# Patient Record
Sex: FEMALE | Race: WHITE | NOT HISPANIC OR LATINO | URBAN - METROPOLITAN AREA
[De-identification: names, ages, dates, MRNs, and addresses within clinical notes are randomized per-mention and may not be internally consistent; named-entity substitution may affect disease eponyms.]

---

## 2018-12-15 ENCOUNTER — EMERGENCY (EMERGENCY)
Facility: HOSPITAL | Age: 43
LOS: 1 days | Discharge: HOME | End: 2018-12-15
Attending: EMERGENCY MEDICINE
Payer: COMMERCIAL

## 2018-12-15 VITALS
DIASTOLIC BLOOD PRESSURE: 81 MMHG | HEIGHT: 64 IN | WEIGHT: 130.07 LBS | TEMPERATURE: 99 F | SYSTOLIC BLOOD PRESSURE: 122 MMHG | HEART RATE: 90 BPM | OXYGEN SATURATION: 98 % | RESPIRATION RATE: 18 BRPM

## 2018-12-15 VITALS
SYSTOLIC BLOOD PRESSURE: 115 MMHG | HEART RATE: 88 BPM | TEMPERATURE: 98 F | RESPIRATION RATE: 18 BRPM | DIASTOLIC BLOOD PRESSURE: 55 MMHG | OXYGEN SATURATION: 98 %

## 2018-12-15 PROCEDURE — 99283 EMERGENCY DEPT VISIT LOW MDM: CPT

## 2018-12-15 RX ORDER — FAMOTIDINE 10 MG/ML
40 INJECTION INTRAVENOUS ONCE
Qty: 0 | Refills: 0 | Status: COMPLETED | OUTPATIENT
Start: 2018-12-15 | End: 2018-12-15

## 2018-12-15 RX ORDER — FAMOTIDINE 10 MG/ML
1 INJECTION INTRAVENOUS
Qty: 14 | Refills: 0 | OUTPATIENT
Start: 2018-12-15 | End: 2018-12-21

## 2018-12-15 RX ORDER — DIPHENHYDRAMINE HCL 50 MG
50 CAPSULE ORAL ONCE
Qty: 0 | Refills: 0 | Status: COMPLETED | OUTPATIENT
Start: 2018-12-15 | End: 2018-12-15

## 2018-12-15 RX ADMIN — Medication 50 MILLIGRAM(S): at 22:29

## 2018-12-15 RX ADMIN — FAMOTIDINE 40 MILLIGRAM(S): 10 INJECTION INTRAVENOUS at 22:29

## 2018-12-15 RX ADMIN — Medication 50 MILLIGRAM(S): at 22:30

## 2018-12-15 NOTE — ED PROVIDER NOTE - NS ED ROS FT
Constitutional: no fevers, chills  HEENT: no visual changes, no sore throat, no rhinorrhea  CV: no cp  Resp: no sob  GI: no abd pain, n/v, diarrhea/constipation  : no dysuria, hematuria  MSK: no joint pains  skin: +generalized uticaria  neuro: no HA, no confusion  psych: no SI/HI

## 2018-12-15 NOTE — ED PROVIDER NOTE - OBJECTIVE STATEMENT
44yo F h/o anxiety p/w generalized uticaria which started last night. reports she has been taking bactrim x9d, stopped yesterday, for a sinus infection. Rash started on her L arm and has since spread. Saw her PMD today who rx her medrol dose pack and hydroxyzine, however reports the rash is continuing to worsen. Only new exposure is bactrim and ate peking duck yesterday which she has never had. Denies any other new foods, detergents, clothing. + itching. Denies diff breathing, voice changes, diff swallowing, sob, abd pain, n/v. Took 20mg prednisone and hydroxyzine this AM.

## 2018-12-15 NOTE — ED PROVIDER NOTE - ATTENDING CONTRIBUTION TO CARE
Scarlet Macias MD - Attending Physician: I have personally seen and examined this patient with the resident/fellow.  I have fully participated in the care of this patient. I have reviewed all pertinent clinical information, including history, physical exam, plan and the Resident/Fellow’s note and agree except as noted. See MDM

## 2018-12-15 NOTE — ED PROVIDER NOTE - PHYSICAL EXAMINATION
Vitals: WNL  Gen: anxious, able to speak full sentences  Head: NCAT  ENT: sclerae white, anicterus, moist mucous membranes, no tonsillar/uvula swelling  CV: RRR. Audible S1 and S2. No murmurs, rubs, gallops, S3, nor S4,   Pulm: Clear to auscultation bilaterally. No wheezes, rales, or rhonchi  Abd: soft, normoactive BS x4, NTND, no rebound, no guarding, no rashes  Musculoskeletal:  No peripheral edema  Skin: generalized blanching uticaria all over body including face, torso, UE/LE

## 2018-12-15 NOTE — ED ADULT NURSE NOTE - NSIMPLEMENTINTERV_GEN_ALL_ED
Patient is rescheduled for 01/31   Implemented All Universal Safety Interventions:  [place] to call system. Call bell, personal items and telephone within reach. Instruct patient to call for assistance. Room bathroom lighting operational. Non-slip footwear when patient is off stretcher. Physically safe environment: no spills, clutter or unnecessary equipment. Stretcher in lowest position, wheels locked, appropriate side rails in place.

## 2018-12-15 NOTE — ED PROVIDER NOTE - NSFOLLOWUPCLINICS_GEN_ALL_ED_FT
[facility]  Allergy  [address] [place] [address]  Phone: [phone]  Fax:   Follow Up Time:

## 2018-12-15 NOTE — ED ADULT NURSE NOTE - OBJECTIVE STATEMENT
42 y/o female, c/o rash and itching beginning on left forearm and progressing to all extremities, torso, and face since friday. Saw  as outpatient, given atarax and medrol dosepack, came to ED due to no improvement and getting worse. Reports taking bactrim past week for sinus infection. Only new food was eating Duck earlier on Friday. Began new skin care products 1 week ago. Denies headache, weakness, dizziness, fevers, chills, or chest pains. No angioedema, no difficulty swallowing. Breathing even, full, unlabored, no cough, no SOB. Abdomen soft, nontender, nondistended, no nausea/vomiting/constipation/diarrhea/urinary complaints. Skin warm/dry, hives of all extremities, torso, and face. Diffuse pruritis of all extremities, torso, and face. Describes burning sensation of axillae. Stretcher locked in low position. Advised of plan of care. Family at bedside.

## 2018-12-15 NOTE — ED PROVIDER NOTE - NSFOLLOWUPINSTRUCTIONS_ED_ALL_ED_FT
Please take Benadryl 25mg every 6 hours as needed for itching.     Return to the ED if you have difficulty breathing, facial swelling, vomiting, or any other concerns

## 2018-12-15 NOTE — ED PROVIDER NOTE - MEDICAL DECISION MAKING DETAILS
44yo F h/o anxiety p/w generalized uticaria which started last night. no concern for anaphylaxis. likely allergic reaction. benadryl, steroids, pepcid. will send rx to pharmacy and d/c with strict return precautions.. 42yo F h/o anxiety p/w generalized uticaria which started last night. no concern for anaphylaxis. likely allergic reaction. benadryl, steroids, pepcid. will send rx to pharmacy and d/c with strict return precautions.    Scarlet Macias MD - Attending Physician: Pt here with urticaria, no systemic allergy systems. Well appearing, no airway concerns. Supportive care. Meds as ordered. F/u with allergy

## 2018-12-15 NOTE — ED ADULT NURSE REASSESSMENT NOTE - NS ED NURSE REASSESS COMMENT FT1
Pt a&o x3, resting on stretcher, in no acute distress, vital signs stable, reports feeling well enough to return home. D/C as per Dr Mathis.

## 2018-12-15 NOTE — ED ADULT NURSE NOTE - CHIEF COMPLAINT QUOTE
allergic rx'n rash, itching on medrol and atarax, no tongue swelling  on Bactrim for sinus infection

## 2020-08-12 NOTE — ED PROVIDER NOTE - CARE PLAN
Review of Systems:  	•	CONSTITUTIONAL: no fever  	•	SKIN: no rash  	•	RESPIRATORY: no shortness of breath  	•	CARDIAC: +chest pain  	•	GI:  no abd pain, no nausea, no vomiting, no diarrhea  	•	GENITO-URINARY:  no dysuria  	•	MUSCULOSKELETAL:  no back pain  	•	NEUROLOGIC: no weakness  	•	ALLERGY: no rhinorrhea  	•	PSYSCHIATRIC: appropriate concern about symptoms
Principal Discharge DX:	Hives

## 2020-11-18 PROBLEM — Z00.00 ENCOUNTER FOR PREVENTIVE HEALTH EXAMINATION: Status: ACTIVE | Noted: 2020-11-18

## 2020-12-01 ENCOUNTER — APPOINTMENT (OUTPATIENT)
Dept: OTOLARYNGOLOGY | Facility: CLINIC | Age: 45
End: 2020-12-01
Payer: COMMERCIAL

## 2020-12-01 VITALS — HEIGHT: 65 IN | WEIGHT: 150 LBS | BODY MASS INDEX: 24.99 KG/M2 | TEMPERATURE: 98.7 F

## 2020-12-01 DIAGNOSIS — Z82.49 FAMILY HISTORY OF ISCHEMIC HEART DISEASE AND OTHER DISEASES OF THE CIRCULATORY SYSTEM: ICD-10-CM

## 2020-12-01 DIAGNOSIS — Z78.9 OTHER SPECIFIED HEALTH STATUS: ICD-10-CM

## 2020-12-01 DIAGNOSIS — Z80.9 FAMILY HISTORY OF MALIGNANT NEOPLASM, UNSPECIFIED: ICD-10-CM

## 2020-12-01 DIAGNOSIS — Z85.820 PERSONAL HISTORY OF MALIGNANT MELANOMA OF SKIN: ICD-10-CM

## 2020-12-01 DIAGNOSIS — Z87.891 PERSONAL HISTORY OF NICOTINE DEPENDENCE: ICD-10-CM

## 2020-12-01 DIAGNOSIS — Z83.3 FAMILY HISTORY OF DIABETES MELLITUS: ICD-10-CM

## 2020-12-01 PROCEDURE — 99072 ADDL SUPL MATRL&STAF TM PHE: CPT

## 2020-12-01 PROCEDURE — 99203 OFFICE O/P NEW LOW 30 MIN: CPT | Mod: 25

## 2020-12-01 PROCEDURE — 31231 NASAL ENDOSCOPY DX: CPT

## 2020-12-01 RX ORDER — CLONAZEPAM 2 MG/1
TABLET ORAL
Refills: 0 | Status: ACTIVE | COMMUNITY

## 2020-12-01 RX ORDER — CETIRIZINE HCL 10 MG
TABLET ORAL
Refills: 0 | Status: ACTIVE | COMMUNITY

## 2020-12-01 RX ORDER — FLUOXETINE HCL 10 MG
TABLET ORAL
Refills: 0 | Status: ACTIVE | COMMUNITY

## 2020-12-01 NOTE — REVIEW OF SYSTEMS
[Post Nasal Drip] : post nasal drip [Nasal Congestion] : nasal congestion [Throat Clearing] : throat clearing [Cough] : cough [SOB on Exertion] : shortness of breath during exertion [Negative] : Heme/Lymph [Patient Intake Form Reviewed] : Patient intake form was reviewed

## 2020-12-03 NOTE — HISTORY OF PRESENT ILLNESS
[de-identified] : MARY MANSFIELD is a 45 year woman with a history of many years of annual sinus infection manifest with purulent rhinorrhea, headache and cough.2 year ago she had infection and used Bactrim and had severe rash. Treated with steroids. She used antibiotic (?cephalosporin) this past summer. She had a cold and headache about a year ago and has been treated twice with steroids. She has low grade congestion and mild breathing problems.\par

## 2020-12-03 NOTE — ASSESSMENT
[FreeTextEntry1] : MARY MANSFIELD has CRS and LPR. I think the LPR os driving her symptoms. \par I suggested and discussed in detail a strict reflux diet and gave the patient a handout.\par I am recommending Prilosec 20 mg 30-40 minutes before breakfast on an empty stomach.\par I am recommending Pepcid 20mg  before bedtime.\par \par \par

## 2020-12-08 LAB — BACTERIA FLD CULT: NORMAL

## 2021-03-05 ENCOUNTER — NON-APPOINTMENT (OUTPATIENT)
Age: 46
End: 2021-03-05

## 2021-03-11 ENCOUNTER — APPOINTMENT (OUTPATIENT)
Dept: OTOLARYNGOLOGY | Facility: CLINIC | Age: 46
End: 2021-03-11
Payer: COMMERCIAL

## 2021-03-11 VITALS — HEIGHT: 65 IN | BODY MASS INDEX: 24.99 KG/M2 | WEIGHT: 150 LBS | TEMPERATURE: 98.7 F

## 2021-03-11 PROCEDURE — 99214 OFFICE O/P EST MOD 30 MIN: CPT

## 2021-03-11 PROCEDURE — 99072 ADDL SUPL MATRL&STAF TM PHE: CPT

## 2021-03-11 RX ORDER — PREDNISONE 10 MG/1
10 TABLET ORAL
Qty: 12 | Refills: 0 | Status: DISCONTINUED | COMMUNITY
Start: 2021-03-02 | End: 2021-03-11

## 2021-03-11 RX ORDER — GABAPENTIN 400 MG/1
CAPSULE ORAL
Refills: 0 | Status: DISCONTINUED | COMMUNITY
End: 2021-03-11

## 2021-03-11 RX ORDER — PREDNISONE 10 MG/1
10 TABLET ORAL
Qty: 12 | Refills: 0 | Status: DISCONTINUED | COMMUNITY
Start: 2020-12-03 | End: 2021-03-11

## 2021-03-11 NOTE — REVIEW OF SYSTEMS
[Post Nasal Drip] : post nasal drip [Nasal Congestion] : nasal congestion [Sinus Pain] : sinus pain [Sinus Pressure] : sinus pressure [Cough] : cough [Negative] : Heme/Lymph [Patient Intake Form Reviewed] : Patient intake form was reviewed

## 2021-03-16 NOTE — HISTORY OF PRESENT ILLNESS
[de-identified] : MARY MANSFIELD is a 45 year woman with a history of many years of annual sinus infection manifest with purulent rhinorrhea, headache and cough.2 year ago she had infection and used Bactrim and had severe rash. Treated with steroids. She used antibiotic (?cephalosporin) this past summer. She had a cold and headache about a year ago and has been treated twice with steroids. She has low grade congestion and mild breathing problems.\par I treated for LPR and she took prednisone taper.\par She felt better on reflux regimen. She had smoked and then not smoked and improved.She has quit again. She feels she might have mild SOB. She is having trouble breathing from time to time.She wonders if she has a pulmonary problem or panic attack.\par \par

## 2021-03-16 NOTE — ASSESSMENT
[FreeTextEntry1] : MARY MANSFIELD  has cough, sob and mild wheezing. I suggest pulmonary evaluation. Because if her ongoing sinus issues following treatment I am sending her for sinus CT.

## 2021-04-27 ENCOUNTER — APPOINTMENT (OUTPATIENT)
Dept: OTOLARYNGOLOGY | Facility: CLINIC | Age: 46
End: 2021-04-27
Payer: COMMERCIAL

## 2021-04-27 VITALS — BODY MASS INDEX: 24.99 KG/M2 | TEMPERATURE: 98 F | HEIGHT: 65 IN | WEIGHT: 150 LBS

## 2021-04-27 DIAGNOSIS — K21.9 GASTRO-ESOPHAGEAL REFLUX DISEASE W/OUT ESOPHAGITIS: ICD-10-CM

## 2021-04-27 DIAGNOSIS — J01.00 ACUTE MAXILLARY SINUSITIS, UNSPECIFIED: ICD-10-CM

## 2021-04-27 DIAGNOSIS — J45.909 UNSPECIFIED ASTHMA, UNCOMPLICATED: ICD-10-CM

## 2021-04-27 PROCEDURE — 31231 NASAL ENDOSCOPY DX: CPT

## 2021-04-27 PROCEDURE — 99072 ADDL SUPL MATRL&STAF TM PHE: CPT

## 2021-04-27 RX ORDER — BUDESONIDE 0.5 MG/2ML
0.5 INHALANT ORAL
Qty: 60 | Refills: 3 | Status: ACTIVE | COMMUNITY
Start: 2021-04-27 | End: 1900-01-01

## 2021-04-27 NOTE — HISTORY OF PRESENT ILLNESS
[de-identified] : MARY MANSFIELD is a 45 year woman with a history of CRS. Recent CT shows extensive inflammation.

## 2021-04-27 NOTE — ASSESSMENT
[FreeTextEntry1] : MARY MANSFIELD has CRS and LPR. She will continue with  reflux treatment. I suggest rinsing with Budesonide. RTC 3 weeks